# Patient Record
Sex: MALE | Race: WHITE | NOT HISPANIC OR LATINO | URBAN - METROPOLITAN AREA
[De-identification: names, ages, dates, MRNs, and addresses within clinical notes are randomized per-mention and may not be internally consistent; named-entity substitution may affect disease eponyms.]

---

## 2023-10-18 ENCOUNTER — OFFICE VISIT (OUTPATIENT)
Dept: UROLOGY | Facility: CLINIC | Age: 83
End: 2023-10-18
Payer: MEDICARE

## 2023-10-18 DIAGNOSIS — N40.0 BENIGN PROSTATIC HYPERPLASIA, UNSPECIFIED WHETHER LOWER URINARY TRACT SYMPTOMS PRESENT: Primary | ICD-10-CM

## 2023-10-18 LAB
BILIRUBIN, UA POC OHS: NEGATIVE
BLOOD, UA POC OHS: NEGATIVE
CLARITY, UA POC OHS: CLEAR
COLOR, UA POC OHS: YELLOW
GLUCOSE, UA POC OHS: NEGATIVE
KETONES, UA POC OHS: NEGATIVE
LEUKOCYTES, UA POC OHS: NEGATIVE
NITRITE, UA POC OHS: NEGATIVE
PH, UA POC OHS: 6
PROTEIN, UA POC OHS: NEGATIVE
SPECIFIC GRAVITY, UA POC OHS: 1.01
UROBILINOGEN, UA POC OHS: 0.2

## 2023-10-18 PROCEDURE — 81003 POCT URINALYSIS(INSTRUMENT): ICD-10-PCS | Mod: QW,S$GLB,, | Performed by: NURSE PRACTITIONER

## 2023-10-18 PROCEDURE — 99204 PR OFFICE/OUTPT VISIT, NEW, LEVL IV, 45-59 MIN: ICD-10-PCS | Mod: S$GLB,,, | Performed by: NURSE PRACTITIONER

## 2023-10-18 PROCEDURE — 99204 OFFICE O/P NEW MOD 45 MIN: CPT | Mod: S$GLB,,, | Performed by: NURSE PRACTITIONER

## 2023-10-18 PROCEDURE — 81003 URINALYSIS AUTO W/O SCOPE: CPT | Mod: QW,S$GLB,, | Performed by: NURSE PRACTITIONER

## 2023-10-18 RX ORDER — ASPIRIN 81 MG/1
81 TABLET ORAL
COMMUNITY
Start: 2023-08-21

## 2023-10-18 RX ORDER — OLMESARTAN MEDOXOMIL, AMLODIPINE AND HYDROCHLOROTHIAZIDE TABLET 40/5/25 MG 40; 5; 25 MG/1; MG/1; MG/1
1 TABLET ORAL
COMMUNITY
Start: 2023-07-27

## 2023-10-18 RX ORDER — ROSUVASTATIN CALCIUM 10 MG/1
10 TABLET, COATED ORAL NIGHTLY
COMMUNITY
Start: 2023-08-30

## 2023-10-18 RX ORDER — POTASSIUM CHLORIDE 750 MG/1
TABLET, EXTENDED RELEASE ORAL
COMMUNITY
Start: 2023-08-21

## 2023-10-18 RX ORDER — TRAMADOL HYDROCHLORIDE 50 MG/1
TABLET ORAL
COMMUNITY
Start: 2023-04-13

## 2023-10-18 RX ORDER — EMPAGLIFLOZIN 10 MG/1
10 TABLET, FILM COATED ORAL EVERY MORNING
COMMUNITY
Start: 2023-08-21

## 2023-10-18 RX ORDER — ALFUZOSIN HYDROCHLORIDE 10 MG/1
10 TABLET, EXTENDED RELEASE ORAL
Qty: 90 TABLET | Refills: 3 | Status: SHIPPED | OUTPATIENT
Start: 2023-10-18

## 2023-10-18 NOTE — PROGRESS NOTES
Subjective:       Patient ID: Isaias Hartman is a 83 y.o. male.    Chief Complaint: Urinary Frequency      HPI: 83-year-old male new to the service presents with his wife to establish care.  Patient has a history of RCC status post right partial nephrectomy with new bladder wall thickening.  He underwent partial nephrectomy of the right renal May of 2019.  Patient's last CT scan of the chest abdomen and pelvis with and without contrast 09/20/2023 no recurrence.  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  Patient is followed locally by pulmonology.  He is on Flomax 0.4 mg daily for symptoms of BPH with LUTS.  He flexes doses the medicine secondary to periods of lightheadedness as an adverse event.  No other urologic concerns today.         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   Current Medications    ASPIRIN (ECOTRIN) 81 MG EC TABLET    Take 81 mg by mouth.    JARDIANCE 10 MG TABLET    Take 10 mg by mouth every morning.    KLOR-CON M10 10 MEQ TABLET        OLMESARTAN-AMLODIPIN-HCTHIAZID 40-5-25 MG TAB    Take 1 tablet by mouth.    ROSUVASTATIN (CRESTOR) 10 MG TABLET    Take 10 mg by mouth every evening.    TRAMADOL (ULTRAM) 50 MG TABLET    TAKE 1 TAB(S) ORALLY EVERY 4 HOURS AS NEEDED PAIN        Past Social History:   Social History     Socioeconomic History    Marital  status:    Tobacco Use    Smoking status: Never    Smokeless tobacco: Never   Substance and Sexual Activity    Alcohol use: Never    Drug use: Never       Allergies: Review of patient's allergies indicates:  No Known Allergies     Family History:   Family History   Problem Relation Age of Onset    Cancer Father     Cancer Maternal Grandfather         Review of Systems:  Review of Systems   Constitutional:  Negative for activity change and appetite change.   HENT:  Negative for congestion and dental problem.    Eyes:  Negative for visual disturbance.   Respiratory:  Negative for chest tightness and shortness of breath.    Cardiovascular:  Negative for chest pain.   Gastrointestinal:  Negative for abdominal distention and abdominal pain.   Genitourinary:  Negative for decreased urine volume, difficulty urinating, dysuria, enuresis, flank pain, frequency, genital sores, hematuria, penile discharge, penile pain, penile swelling, scrotal swelling, testicular pain and urgency.   Musculoskeletal:  Negative for back pain and neck pain.   Skin:  Negative for color change.   Neurological:  Negative for dizziness.   Hematological:  Negative for adenopathy.   Psychiatric/Behavioral:  Negative for agitation, behavioral problems and confusion.        Physical Exam:  Physical Exam  Constitutional:       Appearance: He is well-developed.   HENT:      Head: Normocephalic.   Eyes:      General: No scleral icterus.  Pulmonary:      Effort: Pulmonary effort is normal.      Breath sounds: Normal breath sounds.   Abdominal:      General: There is no distension.      Palpations: Abdomen is soft.      Tenderness: There is no abdominal tenderness.      Hernia: No hernia is present. There is no hernia in the right inguinal area or left inguinal area.   Genitourinary:     Penis: Normal.       Testes: Normal. Cremasteric reflex is present.   Musculoskeletal:      Cervical back: Normal range of motion.   Skin:     General: Skin is warm  and dry.   Neurological:      Mental Status: He is alert and oriented to person, place, and time.         Assessment/Plan:   Right renal carcinoma status post right partial nephrectomy 2019--patient is up-to-date with his current CT scan see HPI above.  Schedule patient back 6 months to meet Dr. Wolf in establish long-term care plan.  Patient has current labs to include renal function which shows a normal BUN creatinine and GFR.    Bladder diverticulum--identified on CT scan and confirmed with recent cystoscopy September of 2023.  No further workup indicated at this time.  PVR today is 65 mL, urinalysis is negative    BPH with LUTS--will discontinue Flomax and try him on alfuzosin secondary to symptoms of lightheadedness with the Flomax.  Problem List Items Addressed This Visit    None  Visit Diagnoses       Benign prostatic hyperplasia, unspecified whether lower urinary tract symptoms present    -  Primary    Relevant Medications    alfuzosin (UROXATRAL) 10 mg Tb24    Other Relevant Orders    POCT Urinalysis(Instrument)    POCT Bladder Scan

## 2023-10-20 ENCOUNTER — TELEPHONE (OUTPATIENT)
Dept: UROLOGY | Facility: CLINIC | Age: 83
End: 2023-10-20
Payer: MEDICARE

## 2023-10-20 NOTE — TELEPHONE ENCOUNTER
----- Message from Claire Zarco MA sent at 10/20/2023 12:28 PM CDT -----    ----- Message -----  From: Estelita Adams  Sent: 10/20/2023   9:17 AM CDT  To: [facility] Staff    Patient is calling in regards to medication side effects and would like to go back to the Flomax...Please call him back at [phone].              Thanks  [facility]

## 2023-10-20 NOTE — TELEPHONE ENCOUNTER
Notified pt that a message would be sent to the provider in regards to the medication that he was started on. Pt stated that the medicine makes his blood pressure drop to low. Pt would like to go back to the [facility] instead. ED

## 2023-10-26 ENCOUNTER — TELEPHONE (OUTPATIENT)
Dept: UROLOGY | Facility: CLINIC | Age: 83
End: 2023-10-26
Payer: MEDICARE

## 2023-10-26 RX ORDER — FINASTERIDE 5 MG/1
5 TABLET, FILM COATED ORAL DAILY
Qty: 30 TABLET | Refills: 11 | Status: SHIPPED | OUTPATIENT
Start: 2023-10-26 | End: 2023-11-03 | Stop reason: SDUPTHER

## 2023-10-26 NOTE — TELEPHONE ENCOUNTER
----- Message from Nancy Calvo sent at 10/26/2023  8:43 AM CDT -----  Contact: self  Type:  RX Refill Request    Who Called: Isaias Hartman  Refill or New Rx:refill  RX Name and Strength:flomax  How is the patient currently taking it? (ex. 1XDay):daily  Is this a 30 day or 90 day RX:90  Preferred Pharmacy with phone number:  [facility]/pharmacy #[id number] [facility] [address] [facility] [address]  Phone: [phone] Fax: [phone]      Local or Mail Order:local  Ordering Provider:maximo palacios  Would the patient rather a call back or a response via [facility]?   Best Call Back Number:[phone]  Additional Information: n/a

## 2023-11-05 RX ORDER — FINASTERIDE 5 MG/1
5 TABLET, FILM COATED ORAL DAILY
Qty: 90 TABLET | Refills: 3 | Status: SHIPPED | OUTPATIENT
Start: 2023-11-05 | End: 2024-11-04